# Patient Record
Sex: FEMALE | URBAN - METROPOLITAN AREA
[De-identification: names, ages, dates, MRNs, and addresses within clinical notes are randomized per-mention and may not be internally consistent; named-entity substitution may affect disease eponyms.]

---

## 2017-05-15 ENCOUNTER — APPOINTMENT (OUTPATIENT)
Age: 27
Setting detail: DERMATOLOGY
End: 2017-05-15

## 2017-05-15 DIAGNOSIS — Z71.89 OTHER SPECIFIED COUNSELING: ICD-10-CM

## 2017-05-15 DIAGNOSIS — D22 MELANOCYTIC NEVI: ICD-10-CM

## 2017-05-15 PROBLEM — D23.71 OTHER BENIGN NEOPLASM OF SKIN OF RIGHT LOWER LIMB, INCLUDING HIP: Status: ACTIVE | Noted: 2017-05-15

## 2017-05-15 PROBLEM — D22.62 MELANOCYTIC NEVI OF LEFT UPPER LIMB, INCLUDING SHOULDER: Status: ACTIVE | Noted: 2017-05-15

## 2017-05-15 PROBLEM — D22.4 MELANOCYTIC NEVI OF SCALP AND NECK: Status: ACTIVE | Noted: 2017-05-15

## 2017-05-15 PROCEDURE — OTHER COUNSELING: OTHER

## 2017-05-15 PROCEDURE — 99203 OFFICE O/P NEW LOW 30 MIN: CPT

## 2017-05-15 PROCEDURE — OTHER OBSERVATION AND MEASURE: OTHER

## 2017-05-15 PROCEDURE — OTHER OBSERVATION: OTHER

## 2017-05-15 PROCEDURE — OTHER EDUCATIONAL RESOURCES PROVIDED: OTHER

## 2017-05-15 ASSESSMENT — LOCATION ZONE DERM
LOCATION ZONE: NECK
LOCATION ZONE: ARM

## 2017-05-15 ASSESSMENT — LOCATION DETAILED DESCRIPTION DERM
LOCATION DETAILED: RIGHT INFERIOR ANTERIOR NECK
LOCATION DETAILED: LEFT POSTERIOR SHOULDER
LOCATION DETAILED: LEFT ANTERIOR SHOULDER
LOCATION DETAILED: RIGHT POSTERIOR SHOULDER

## 2017-05-15 ASSESSMENT — LOCATION SIMPLE DESCRIPTION DERM
LOCATION SIMPLE: RIGHT SHOULDER
LOCATION SIMPLE: LEFT SHOULDER
LOCATION SIMPLE: RIGHT ANTERIOR NECK

## 2017-05-15 NOTE — PROCEDURE: OBSERVATION
Detail Level: Detailed
Size Of Lesion: 3 x 6 mm
Size Of Lesion: 3 x 4mm
Morphology Per Location (Optional): Brown Macule
Size Of Lesion: 6mm

## 2017-05-15 NOTE — HPI: SKIN LESIONS
How Severe Is Your Skin Lesion?: mild
Have Your Skin Lesions Been Treated?: not been treated
Is This A New Presentation, Or A Follow-Up?: Skin Lesions
Is This A New Presentation, Or A Follow-Up?: Skin Lesion

## 2019-10-02 ENCOUNTER — APPOINTMENT (OUTPATIENT)
Age: 29
Setting detail: DERMATOLOGY
End: 2019-10-02

## 2019-10-02 DIAGNOSIS — D22 MELANOCYTIC NEVI: ICD-10-CM

## 2019-10-02 DIAGNOSIS — Z71.89 OTHER SPECIFIED COUNSELING: ICD-10-CM

## 2019-10-02 PROBLEM — D22.4 MELANOCYTIC NEVI OF SCALP AND NECK: Status: ACTIVE | Noted: 2019-10-02

## 2019-10-02 PROBLEM — D22.62 MELANOCYTIC NEVI OF LEFT UPPER LIMB, INCLUDING SHOULDER: Status: ACTIVE | Noted: 2019-10-02

## 2019-10-02 PROBLEM — D22.5 MELANOCYTIC NEVI OF TRUNK: Status: ACTIVE | Noted: 2019-10-02

## 2019-10-02 PROBLEM — D23.72 OTHER BENIGN NEOPLASM OF SKIN OF LEFT LOWER LIMB, INCLUDING HIP: Status: ACTIVE | Noted: 2019-10-02

## 2019-10-02 PROCEDURE — OTHER COUNSELING: OTHER

## 2019-10-02 PROCEDURE — OTHER OBSERVATION: OTHER

## 2019-10-02 PROCEDURE — 99214 OFFICE O/P EST MOD 30 MIN: CPT

## 2019-10-02 PROCEDURE — OTHER EDUCATIONAL RESOURCES PROVIDED: OTHER

## 2019-10-02 PROCEDURE — OTHER OBSERVATION AND MEASURE: OTHER

## 2019-10-02 ASSESSMENT — LOCATION ZONE DERM
LOCATION ZONE: TRUNK
LOCATION ZONE: ARM
LOCATION ZONE: NECK

## 2019-10-02 ASSESSMENT — LOCATION SIMPLE DESCRIPTION DERM
LOCATION SIMPLE: UPPER BACK
LOCATION SIMPLE: RIGHT ANTERIOR NECK
LOCATION SIMPLE: LEFT SHOULDER

## 2019-10-02 ASSESSMENT — LOCATION DETAILED DESCRIPTION DERM
LOCATION DETAILED: RIGHT INFERIOR ANTERIOR NECK
LOCATION DETAILED: LEFT ANTERIOR SHOULDER
LOCATION DETAILED: SUPERIOR THORACIC SPINE
LOCATION DETAILED: INFERIOR THORACIC SPINE

## 2019-10-02 NOTE — PROCEDURE: OBSERVATION
Size Of Lesion: 6mm
Morphology Per Location (Optional): Symmetric brown papule
Detail Level: Detailed
Size Of Lesion: 4mm x 6mm
Morphology Per Location (Optional): Brown papule
Size Of Lesion: 3mm x 4mm

## 2021-10-14 ENCOUNTER — APPOINTMENT (OUTPATIENT)
Age: 31
Setting detail: DERMATOLOGY
End: 2021-10-14

## 2021-10-14 DIAGNOSIS — Z12.83 ENCOUNTER FOR SCREENING FOR MALIGNANT NEOPLASM OF SKIN: ICD-10-CM

## 2021-10-14 DIAGNOSIS — D22 MELANOCYTIC NEVI: ICD-10-CM

## 2021-10-14 PROBLEM — D22.62 MELANOCYTIC NEVI OF LEFT UPPER LIMB, INCLUDING SHOULDER: Status: ACTIVE | Noted: 2021-10-14

## 2021-10-14 PROBLEM — D22.4 MELANOCYTIC NEVI OF SCALP AND NECK: Status: ACTIVE | Noted: 2021-10-14

## 2021-10-14 PROBLEM — D23.72 OTHER BENIGN NEOPLASM OF SKIN OF LEFT LOWER LIMB, INCLUDING HIP: Status: ACTIVE | Noted: 2021-10-14

## 2021-10-14 PROBLEM — D22.5 MELANOCYTIC NEVI OF TRUNK: Status: ACTIVE | Noted: 2021-10-14

## 2021-10-14 PROCEDURE — 99213 OFFICE O/P EST LOW 20 MIN: CPT

## 2021-10-14 PROCEDURE — OTHER COUNSELING: OTHER

## 2021-10-14 PROCEDURE — OTHER EDUCATIONAL RESOURCES PROVIDED: OTHER

## 2021-10-14 PROCEDURE — OTHER OBSERVATION AND MEASURE: OTHER

## 2021-10-14 PROCEDURE — OTHER OBSERVATION: OTHER

## 2021-10-14 ASSESSMENT — LOCATION SIMPLE DESCRIPTION DERM
LOCATION SIMPLE: LEFT SHOULDER
LOCATION SIMPLE: RIGHT ANTERIOR NECK
LOCATION SIMPLE: UPPER BACK

## 2021-10-14 ASSESSMENT — LOCATION ZONE DERM
LOCATION ZONE: NECK
LOCATION ZONE: TRUNK
LOCATION ZONE: ARM

## 2021-10-14 ASSESSMENT — LOCATION DETAILED DESCRIPTION DERM
LOCATION DETAILED: RIGHT INFERIOR ANTERIOR NECK
LOCATION DETAILED: SUPERIOR THORACIC SPINE
LOCATION DETAILED: LEFT ANTERIOR SHOULDER

## 2021-10-14 NOTE — PROCEDURE: COUNSELING
Detail Level: Simple
Detail Level: Generalized
Detail Level: Detailed
Sunscreen Recommendations: -\\nRemoval via shave biopsy was discussed with the patient. She will follow up if she decides to move forward with treatment.

## 2023-07-05 ENCOUNTER — HOSPITAL ENCOUNTER (OUTPATIENT)
Dept: DATA CONVERSION | Facility: HOSPITAL | Age: 33
End: 2023-07-05
Attending: ANESTHESIOLOGY | Admitting: ANESTHESIOLOGY

## 2023-07-05 DIAGNOSIS — M54.16 RADICULOPATHY, LUMBAR REGION: ICD-10-CM

## 2023-07-05 DIAGNOSIS — M51.16 INTERVERTEBRAL DISC DISORDERS WITH RADICULOPATHY, LUMBAR REGION: ICD-10-CM

## 2023-10-02 NOTE — OP NOTE
Post Operative Note:     Post-Procedure Diagnosis: Left-sided radiculitis,  lumbar disc disease   Procedure: 1.   2.   3.   4.   5.   Surgeon: Hans   Resident/Fellow/Other Assistant: None   Estimated Blood Loss (mL): none   Specimen: no   Findings: None     Operative Report Dictated:  Dictation: not applicable - note contains Operative  Report   Operative Report:    Preoperative diagnosis:  Lumbar radiculitis  Postoperative diagnosis:  Lumbar radiculitis, lumbar disc disease  Procedure: Left L5 and S1 lumbar transforaminal epidural steroid injection under fluoroscopic guidance  Surgeon: Kala Maxwell  Assistant:  Fellow  Anesthesia: Local  Complications: Apparently none    Clinical note: Ms. Whiteside is a 33-year-old female with history of low back and left leg pain.  Patient presents today for the aforementioned procedure.  Since I last saw the patient she saw Dr. Barajas  and did have an MRI.  Her MRI was concordant with what we talked about in the office which is a left-sided disc herniation at L5-S1.    Procedure note: The patient was met in the preoperative holding area after risks benefits and alternatives to procedure were discussed with the patient, informed consent was obtained. Patient brought back to the procedure room and placed in the prone  position on the fluoroscopy table. Area over the back was exposed, prepped, draped, in the usual sterile fashion.  Skin and subcutaneous tissues to the neuroforamen was anesthetized using 0.5% lidocaine.  22-gauge Sprotte needles were inserted in the  skin and advanced into the foramen. Needle tip position was confirmed in AP oblique and lateral view.  Contrast was injected which showed appropriate epidural spread, no intravascular or intrathecal uptake. A total of 2 mL of 0.5% lidocaine mixed with  10 mg dexamethasone was injected in divided doses among the 2 needles. Needles removed, bandage applied, patient tolerated the procedure well with no immediate  complications.      Attestation:   Note Completion:  Attending Attestation I performed the procedure without a resident         Electronic Signatures:  Kala Maxwell (MD)  (Signed 05-Jul-2023 15:03)   Authored: Post Operative Note, Note Completion      Last Updated: 05-Jul-2023 15:03 by Kala Maxwell)

## 2024-06-25 ENCOUNTER — OFFICE VISIT (OUTPATIENT)
Dept: ORTHOPEDIC SURGERY | Facility: CLINIC | Age: 34
End: 2024-06-25
Payer: COMMERCIAL

## 2024-06-25 VITALS — HEIGHT: 65 IN | WEIGHT: 140 LBS | BODY MASS INDEX: 23.32 KG/M2

## 2024-06-25 DIAGNOSIS — M54.16 LUMBAR RADICULOPATHY: ICD-10-CM

## 2024-06-25 DIAGNOSIS — M51.26 DISPLACEMENT OF LUMBAR INTERVERTEBRAL DISC WITHOUT MYELOPATHY: Primary | ICD-10-CM

## 2024-06-25 PROCEDURE — 99214 OFFICE O/P EST MOD 30 MIN: CPT | Performed by: ORTHOPAEDIC SURGERY

## 2024-06-25 ASSESSMENT — PAIN - FUNCTIONAL ASSESSMENT: PAIN_FUNCTIONAL_ASSESSMENT: 0-10

## 2024-06-25 NOTE — PROGRESS NOTES
HPI:Madyson Whiteside is a 34-year-old woman who comes in today for a follow-up.  She continues to have left buttock and leg pain.  She has had radicular pain in that leg for upwards of a year.  She has completed physical therapy [facility].  She had a steroid injection.  Despite 12 months of nonoperative management, her symptoms remain.  She has a hard time sitting in a chair secondary to pain in her leg.  Her quality of life is now intolerable.      ROS:  Reviewed on EMR and patient intake sheet.    PMH/SH:  Reviewed on EMR and patient intake sheet.    Exam:  Physical Exam    Constitutional: Well appearing; no acute distress  Eyes: pupils are equal and round  Psych: normal affect  Respiratory: non-labored breathing  Cardiovascular: regular rate and rhythm  GI: non-distended abdomen  Musculoskeletal: no pain with range of motion of the hips bilaterally  Neurologic: [5]/5 strength in the lower extremities bilaterally]; [positive left] straight leg raise    Radiology:     MRI in June 2023 demonstrates a large left L5-S1 paracentral disc herniation with displacement of the traversing S1 nerve root    Diagnosis:    Lumbar radiculopathy; L5-S1 disc herniation    Assessment and Plan:   34-year-old woman with intractable lumbar radiculopathy secondary to an L5-S1 disc herniation which is failed 12 months of nonoperative management including physical therapy and steroid injections.  At this time, she will need an updated MRI and then return to discuss potential surgical intervention.  I will see her back after the MRI.    The patient was in agreement with the plan. At the end of the visit today, the patient felt that all questions had been answered satisfactorily.  The patient was pleased with the visit and very appreciative for the care rendered.     Thank you very much for the kind referral.  It is a privilege, and a pleasure, to partner with you in the care of your patients.  I would be delighted to assist you with  any further consultations as needed.          Bartolo Barajas MD    Chief of Spine Surgery, [facility]  Director of Spine Service, [facility]  , Department of Orthopaedics  [facility] [employer] [facility]  [address] [place] [address]  P: [phone]  [email or web address]    This note was dictated with voice recognition software.  It has not been proofread for grammatical errors, typographical mistakes or other semantic inconsistencies.

## 2024-07-02 ENCOUNTER — APPOINTMENT (OUTPATIENT)
Dept: ORTHOPEDIC SURGERY | Facility: CLINIC | Age: 34
End: 2024-07-02
Payer: COMMERCIAL

## 2024-07-11 ENCOUNTER — APPOINTMENT (OUTPATIENT)
Dept: PAIN MEDICINE | Facility: HOSPITAL | Age: 34
End: 2024-07-11
Payer: COMMERCIAL

## 2024-07-12 ENCOUNTER — HOSPITAL ENCOUNTER (OUTPATIENT)
Dept: RADIOLOGY | Facility: HOSPITAL | Age: 34
Discharge: HOME | End: 2024-07-12
Payer: COMMERCIAL

## 2024-07-12 DIAGNOSIS — M54.16 LUMBAR RADICULOPATHY: ICD-10-CM

## 2024-07-12 PROCEDURE — 72148 MRI LUMBAR SPINE W/O DYE: CPT

## 2024-07-15 ENCOUNTER — APPOINTMENT (OUTPATIENT)
Dept: RADIOLOGY | Facility: HOSPITAL | Age: 34
End: 2024-07-15
Payer: COMMERCIAL

## 2024-07-16 ENCOUNTER — TRANSCRIBE ORDERS (OUTPATIENT)
Dept: ORTHOPEDIC SURGERY | Facility: HOSPITAL | Age: 34
End: 2024-07-16

## 2024-07-16 ENCOUNTER — OFFICE VISIT (OUTPATIENT)
Dept: ORTHOPEDIC SURGERY | Facility: CLINIC | Age: 34
End: 2024-07-16
Payer: COMMERCIAL

## 2024-07-16 ENCOUNTER — APPOINTMENT (OUTPATIENT)
Dept: PAIN MEDICINE | Facility: HOSPITAL | Age: 34
End: 2024-07-16
Payer: COMMERCIAL

## 2024-07-16 VITALS — BODY MASS INDEX: 23.32 KG/M2 | WEIGHT: 140 LBS | HEIGHT: 65 IN

## 2024-07-16 DIAGNOSIS — M51.26 DISPLACEMENT OF LUMBAR INTERVERTEBRAL DISC WITHOUT MYELOPATHY: ICD-10-CM

## 2024-07-16 DIAGNOSIS — M54.16 LUMBAR RADICULOPATHY: ICD-10-CM

## 2024-07-16 DIAGNOSIS — M51.26 DISPLACEMENT OF LUMBAR INTERVERTEBRAL DISC WITHOUT MYELOPATHY: Primary | ICD-10-CM

## 2024-07-16 DIAGNOSIS — Z01.812 ENCOUNTER FOR PRE-OPERATIVE LABORATORY TESTING: ICD-10-CM

## 2024-07-16 PROCEDURE — 99214 OFFICE O/P EST MOD 30 MIN: CPT | Performed by: ORTHOPAEDIC SURGERY

## 2024-07-16 ASSESSMENT — PAIN - FUNCTIONAL ASSESSMENT: PAIN_FUNCTIONAL_ASSESSMENT: 0-10

## 2024-07-16 NOTE — H&P (VIEW-ONLY)
HPI:Madyson Whiteside is a 34-year-old woman, who comes in today for a follow-up to go over her new MRI.  She continues to have left buttock and leg pain which is worse with sitting.  The symptoms have persisted despite 12 months of nonoperative management including physical therapy.  She has also had steroid injections.      ROS:  Reviewed on EMR and patient intake sheet.    PMH/SH:  Reviewed on EMR and patient intake sheet.    Exam:  Physical Exam    Constitutional: Well appearing; no acute distress  Eyes: pupils are equal and round  Psych: normal affect  Respiratory: non-labored breathing  Cardiovascular: regular rate and rhythm  GI: non-distended abdomen  Musculoskeletal: no pain with range of motion of the hips bilaterally  Neurologic: [4+]/5 strength in the lower extremities bilaterally]; [positive left] straight leg raise    Radiology:     MRI demonstrates a left L5-S1 disc herniation with an extruded fragment producing lateral recess stenosis and displacement of the traversing S1 nerve root.    Diagnosis:    Lumbar radiculopathy; L5-S1 disc herniation    Assessment and Plan:   34-year-old woman with intractable lumbar radiculopathy despite 12 months of nonoperative management including physical therapy and steroid injections.  Repeat MRI demonstrates a persistent extruded fragment of disc material in the lateral recess which has displaced the traversing S1 nerve root.  Because of failure nonoperative management, the patient would like proceed with surgical intervention.  She would need a left L5-S1 microdiscectomy.  CPT code 82873.    We discussed surgery today at length, including the specifics of the actual proposed procedure, the projected hospital course and post-operative recovery or rehabilitation, and the expected results of this surgery.  The potential benefits and risks of the proposed surgery include, but are not limited to those of infection, spinal fluid leaks, nerve injury or paralysis, whether that  be complete or partial paralysis, foot drop, instrumentation failure, non-union or latent instability, future adjacent segment disease, epidural hematoma, wound dehiscence, reherniation, persistent pain or paresthesias, and the general risks of anaesthesia including, but not limited to those of stroke, heart attack, respiratory difficulties and death.  The patient understands that there are no guarantees in regard to outcome or potential complications associated with the proposed procedure.  After this discussion, the patient articulated understanding of the topics covered and felt that all questions had been covered and answered satisfactorily.    Patient would like to proceed as soon as possible.    The patient was in agreement with the plan. At the end of the visit today, the patient felt that all questions had been answered satisfactorily.  The patient was pleased with the visit and very appreciative for the care rendered.     Thank you very much for the kind referral.  It is a privilege, and a pleasure, to partner with you in the care of your patients.  I would be delighted to assist you with any further consultations as needed.          Bartolo Barajas MD    Chief of Spine Surgery, [facility]  Director of Spine Service, [facility]  , Department of Orthopaedics  [facility]  [address] [place] [address]  P: [phone]  [facility].[employer]    This note was dictated with voice recognition software.  It has not been proofread for grammatical errors, typographical mistakes or other semantic inconsistencies.

## 2024-07-16 NOTE — PROGRESS NOTES
HPI:Madyson Whiteside is a 34-year-old woman, who comes in today for a follow-up to go over her new MRI.  She continues to have left buttock and leg pain which is worse with sitting.  The symptoms have persisted despite 12 months of nonoperative management including physical therapy.  She has also had steroid injections.      ROS:  Reviewed on EMR and patient intake sheet.    PMH/SH:  Reviewed on EMR and patient intake sheet.    Exam:  Physical Exam    Constitutional: Well appearing; no acute distress  Eyes: pupils are equal and round  Psych: normal affect  Respiratory: non-labored breathing  Cardiovascular: regular rate and rhythm  GI: non-distended abdomen  Musculoskeletal: no pain with range of motion of the hips bilaterally  Neurologic: [4+]/5 strength in the lower extremities bilaterally]; [positive left] straight leg raise    Radiology:     MRI demonstrates a left L5-S1 disc herniation with an extruded fragment producing lateral recess stenosis and displacement of the traversing S1 nerve root.    Diagnosis:    Lumbar radiculopathy; L5-S1 disc herniation    Assessment and Plan:   34-year-old woman with intractable lumbar radiculopathy despite 12 months of nonoperative management including physical therapy and steroid injections.  Repeat MRI demonstrates a persistent extruded fragment of disc material in the lateral recess which has displaced the traversing S1 nerve root.  Because of failure nonoperative management, the patient would like proceed with surgical intervention.  She would need a left L5-S1 microdiscectomy.  CPT code 27210.    We discussed surgery today at length, including the specifics of the actual proposed procedure, the projected hospital course and post-operative recovery or rehabilitation, and the expected results of this surgery.  The potential benefits and risks of the proposed surgery include, but are not limited to those of infection, spinal fluid leaks, nerve injury or paralysis, whether that  be complete or partial paralysis, foot drop, instrumentation failure, non-union or latent instability, future adjacent segment disease, epidural hematoma, wound dehiscence, reherniation, persistent pain or paresthesias, and the general risks of anaesthesia including, but not limited to those of stroke, heart attack, respiratory difficulties and death.  The patient understands that there are no guarantees in regard to outcome or potential complications associated with the proposed procedure.  After this discussion, the patient articulated understanding of the topics covered and felt that all questions had been covered and answered satisfactorily.    Patient would like to proceed as soon as possible.    The patient was in agreement with the plan. At the end of the visit today, the patient felt that all questions had been answered satisfactorily.  The patient was pleased with the visit and very appreciative for the care rendered.     Thank you very much for the kind referral.  It is a privilege, and a pleasure, to partner with you in the care of your patients.  I would be delighted to assist you with any further consultations as needed.          Bartolo Barajas MD    Chief of Spine Surgery, [facility]  Director of Spine Service, [facility]  , Department of Orthopaedics  [facility]  [address] [place] [address]  P: [phone]  [facility].[employer]    This note was dictated with voice recognition software.  It has not been proofread for grammatical errors, typographical mistakes or other semantic inconsistencies.

## 2024-07-19 ENCOUNTER — LAB (OUTPATIENT)
Dept: LAB | Facility: LAB | Age: 34
End: 2024-07-19
Payer: COMMERCIAL

## 2024-07-19 DIAGNOSIS — Z01.812 ENCOUNTER FOR PRE-OPERATIVE LABORATORY TESTING: ICD-10-CM

## 2024-07-19 LAB
ALBUMIN SERPL BCP-MCNC: 4.5 G/DL (ref 3.4–5)
ALP SERPL-CCNC: 31 U/L (ref 33–110)
ALT SERPL W P-5'-P-CCNC: 14 U/L (ref 7–45)
ANION GAP SERPL CALC-SCNC: 14 MMOL/L (ref 10–20)
AST SERPL W P-5'-P-CCNC: 15 U/L (ref 9–39)
BILIRUB SERPL-MCNC: 0.4 MG/DL (ref 0–1.2)
BUN SERPL-MCNC: 18 MG/DL (ref 6–23)
CALCIUM SERPL-MCNC: 9.4 MG/DL (ref 8.6–10.6)
CHLORIDE SERPL-SCNC: 104 MMOL/L (ref 98–107)
CO2 SERPL-SCNC: 26 MMOL/L (ref 21–32)
CREAT SERPL-MCNC: 0.7 MG/DL (ref 0.5–1.05)
EGFRCR SERPLBLD CKD-EPI 2021: >90 ML/MIN/1.73M*2
ERYTHROCYTE [DISTWIDTH] IN BLOOD BY AUTOMATED COUNT: 13.4 % (ref 11.5–14.5)
GLUCOSE SERPL-MCNC: 83 MG/DL (ref 74–99)
HCT VFR BLD AUTO: 33.8 % (ref 36–46)
HGB BLD-MCNC: 11.3 G/DL (ref 12–16)
INR PPP: 1 (ref 0.9–1.1)
MCH RBC QN AUTO: 29.4 PG (ref 26–34)
MCHC RBC AUTO-ENTMCNC: 33.4 G/DL (ref 32–36)
MCV RBC AUTO: 88 FL (ref 80–100)
NRBC BLD-RTO: 0 /100 WBCS (ref 0–0)
PLATELET # BLD AUTO: 272 X10*3/UL (ref 150–450)
POTASSIUM SERPL-SCNC: 4.7 MMOL/L (ref 3.5–5.3)
PROT SERPL-MCNC: 6.6 G/DL (ref 6.4–8.2)
PROTHROMBIN TIME: 11.3 SECONDS (ref 9.8–12.8)
RBC # BLD AUTO: 3.84 X10*6/UL (ref 4–5.2)
SODIUM SERPL-SCNC: 139 MMOL/L (ref 136–145)
WBC # BLD AUTO: 5.1 X10*3/UL (ref 4.4–11.3)

## 2024-07-19 PROCEDURE — 36415 COLL VENOUS BLD VENIPUNCTURE: CPT

## 2024-07-19 PROCEDURE — 80053 COMPREHEN METABOLIC PANEL: CPT

## 2024-07-19 PROCEDURE — 85027 COMPLETE CBC AUTOMATED: CPT

## 2024-07-19 PROCEDURE — 85610 PROTHROMBIN TIME: CPT

## 2024-07-24 ENCOUNTER — ANESTHESIA EVENT (OUTPATIENT)
Dept: OPERATING ROOM | Facility: HOSPITAL | Age: 34
End: 2024-07-24
Payer: COMMERCIAL

## 2024-07-24 NOTE — ANESTHESIA PREPROCEDURE EVALUATION
"Patient: Madyson Whiteside    Procedure Information       Date/Time: 07/25/24 0730    Procedure: Left L5-S1 Lumbar Microdiscectomy (Left: Spine Lumbar)    Location: [facility] A OR 07 / [facility] A OR    Surgeons: Bartolo Barajas MD            Relevant Problems   Neuro   (+) Lumbar radiculopathy      Musculoskeletal   (+) Displacement of lumbar intervertebral disc without myelopathy       Clinical information reviewed:                    No past medical history on file.   No past surgical history on file.      No current outpatient medications   No Known Allergies     Chemistry    Lab Results   Component Value Date/Time     07/19/2024 1039    K 4.7 07/19/2024 1039     07/19/2024 1039    CO2 26 07/19/2024 1039    BUN 18 07/19/2024 1039    CREATININE 0.70 07/19/2024 1039    Lab Results   Component Value Date/Time    CALCIUM 9.4 07/19/2024 1039    ALKPHOS 31 (L) 07/19/2024 1039    AST 15 07/19/2024 1039    ALT 14 07/19/2024 1039    BILITOT 0.4 07/19/2024 1039          No results found for: \"HGBA1C\"  Lab Results   Component Value Date/Time    WBC 5.1 07/19/2024 1039    HGB 11.3 (L) 07/19/2024 1039    HCT 33.8 (L) 07/19/2024 1039     07/19/2024 1039     Lab Results   Component Value Date/Time    PROTIME 11.3 07/19/2024 1039    INR 1.0 07/19/2024 1039     No results found for: \"ABORH\"  No results found for this or any previous visit (from the past 4464 hour(s)).  No results found for this or any previous visit from the past 1095 days.       There were no vitals taken for this visit.  No data recorded     Physical Exam    Airway  Mallampati: II  TM distance: >3 FB  Neck ROM: full     Cardiovascular - normal exam     Dental - normal exam     Pulmonary - normal exam     Abdominal - normal exam              Anesthesia Plan    History of general anesthesia?: no  History of complications of general anesthesia?: no    ASA 2     general     The patient is not a current smoker.    intravenous induction   Postoperative " administration of opioids is intended.  Anesthetic plan and risks discussed with patient.  Use of blood products discussed with patient who.    Plan discussed with CRNA.

## 2024-07-25 ENCOUNTER — ANESTHESIA (OUTPATIENT)
Dept: OPERATING ROOM | Facility: HOSPITAL | Age: 34
End: 2024-07-25
Payer: COMMERCIAL

## 2024-07-25 ENCOUNTER — APPOINTMENT (OUTPATIENT)
Dept: RADIOLOGY | Facility: HOSPITAL | Age: 34
End: 2024-07-25
Payer: COMMERCIAL

## 2024-07-25 ENCOUNTER — HOSPITAL ENCOUNTER (OUTPATIENT)
Facility: HOSPITAL | Age: 34
Setting detail: OUTPATIENT SURGERY
Discharge: HOME | End: 2024-07-25
Attending: ORTHOPAEDIC SURGERY | Admitting: ORTHOPAEDIC SURGERY
Payer: COMMERCIAL

## 2024-07-25 VITALS
HEIGHT: 65 IN | OXYGEN SATURATION: 97 % | RESPIRATION RATE: 14 BRPM | WEIGHT: 141.54 LBS | HEART RATE: 87 BPM | SYSTOLIC BLOOD PRESSURE: 111 MMHG | DIASTOLIC BLOOD PRESSURE: 70 MMHG | TEMPERATURE: 98.2 F | BODY MASS INDEX: 23.58 KG/M2

## 2024-07-25 DIAGNOSIS — T40.2X5A CONSTIPATION DUE TO OPIOID THERAPY: ICD-10-CM

## 2024-07-25 DIAGNOSIS — M51.26 DISPLACEMENT OF LUMBAR INTERVERTEBRAL DISC WITHOUT MYELOPATHY: ICD-10-CM

## 2024-07-25 DIAGNOSIS — R11.0 POSTOPERATIVE NAUSEA: ICD-10-CM

## 2024-07-25 DIAGNOSIS — K59.03 CONSTIPATION DUE TO OPIOID THERAPY: ICD-10-CM

## 2024-07-25 DIAGNOSIS — M54.16 LUMBAR RADICULOPATHY: Primary | ICD-10-CM

## 2024-07-25 DIAGNOSIS — G89.18 POSTOPERATIVE PAIN AFTER SPINAL SURGERY: ICD-10-CM

## 2024-07-25 DIAGNOSIS — Z98.890 POSTOPERATIVE NAUSEA: ICD-10-CM

## 2024-07-25 LAB — PREGNANCY TEST URINE, POC: NEGATIVE

## 2024-07-25 PROCEDURE — 3700000001 HC GENERAL ANESTHESIA TIME - INITIAL BASE CHARGE: Performed by: ORTHOPAEDIC SURGERY

## 2024-07-25 PROCEDURE — 63030 LAMOT DCMPRN NRV RT 1 LMBR: CPT | Performed by: PHYSICIAN ASSISTANT

## 2024-07-25 PROCEDURE — 7100000001 HC RECOVERY ROOM TIME - INITIAL BASE CHARGE: Performed by: ORTHOPAEDIC SURGERY

## 2024-07-25 PROCEDURE — 3600000009 HC OR TIME - EACH INCREMENTAL 1 MINUTE - PROCEDURE LEVEL FOUR: Performed by: ORTHOPAEDIC SURGERY

## 2024-07-25 PROCEDURE — 7100000009 HC PHASE TWO TIME - INITIAL BASE CHARGE: Performed by: ORTHOPAEDIC SURGERY

## 2024-07-25 PROCEDURE — 3700000002 HC GENERAL ANESTHESIA TIME - EACH INCREMENTAL 1 MINUTE: Performed by: ORTHOPAEDIC SURGERY

## 2024-07-25 PROCEDURE — 3600000004 HC OR TIME - INITIAL BASE CHARGE - PROCEDURE LEVEL FOUR: Performed by: ORTHOPAEDIC SURGERY

## 2024-07-25 PROCEDURE — 2720000007 HC OR 272 NO HCPCS: Performed by: ORTHOPAEDIC SURGERY

## 2024-07-25 PROCEDURE — 63030 LAMOT DCMPRN NRV RT 1 LMBR: CPT | Performed by: ORTHOPAEDIC SURGERY

## 2024-07-25 PROCEDURE — 7100000010 HC PHASE TWO TIME - EACH INCREMENTAL 1 MINUTE: Performed by: ORTHOPAEDIC SURGERY

## 2024-07-25 PROCEDURE — 2500000001 HC RX 250 WO HCPCS SELF ADMINISTERED DRUGS (ALT 637 FOR MEDICARE OP): Performed by: ANESTHESIOLOGY

## 2024-07-25 PROCEDURE — 7100000002 HC RECOVERY ROOM TIME - EACH INCREMENTAL 1 MINUTE: Performed by: ORTHOPAEDIC SURGERY

## 2024-07-25 PROCEDURE — 81025 URINE PREGNANCY TEST: CPT | Performed by: ORTHOPAEDIC SURGERY

## 2024-07-25 PROCEDURE — 2500000005 HC RX 250 GENERAL PHARMACY W/O HCPCS

## 2024-07-25 PROCEDURE — 2500000004 HC RX 250 GENERAL PHARMACY W/ HCPCS (ALT 636 FOR OP/ED)

## 2024-07-25 PROCEDURE — 2500000005 HC RX 250 GENERAL PHARMACY W/O HCPCS: Performed by: ORTHOPAEDIC SURGERY

## 2024-07-25 PROCEDURE — 2500000004 HC RX 250 GENERAL PHARMACY W/ HCPCS (ALT 636 FOR OP/ED): Performed by: ANESTHESIOLOGY

## 2024-07-25 PROCEDURE — 76000 FLUOROSCOPY <1 HR PHYS/QHP: CPT

## 2024-07-25 RX ORDER — HYDROMORPHONE HYDROCHLORIDE 1 MG/ML
INJECTION, SOLUTION INTRAMUSCULAR; INTRAVENOUS; SUBCUTANEOUS CONTINUOUS PRN
Status: DISCONTINUED | OUTPATIENT
Start: 2024-07-25 | End: 2024-07-25

## 2024-07-25 RX ORDER — ACETAMINOPHEN 325 MG/1
650 TABLET ORAL EVERY 4 HOURS PRN
Status: DISCONTINUED | OUTPATIENT
Start: 2024-07-25 | End: 2024-07-25 | Stop reason: HOSPADM

## 2024-07-25 RX ORDER — LIDOCAINE 560 MG/1
PATCH PERCUTANEOUS; TOPICAL; TRANSDERMAL AS NEEDED
Status: DISCONTINUED | OUTPATIENT
Start: 2024-07-25 | End: 2024-07-25 | Stop reason: HOSPADM

## 2024-07-25 RX ORDER — OXYCODONE HYDROCHLORIDE 5 MG/1
5 TABLET ORAL EVERY 4 HOURS PRN
Status: DISCONTINUED | OUTPATIENT
Start: 2024-07-25 | End: 2024-07-25 | Stop reason: HOSPADM

## 2024-07-25 RX ORDER — GENTAMICIN 40 MG/ML
INJECTION, SOLUTION INTRAMUSCULAR; INTRAVENOUS AS NEEDED
Status: DISCONTINUED | OUTPATIENT
Start: 2024-07-25 | End: 2024-07-25

## 2024-07-25 RX ORDER — PROPOFOL 10 MG/ML
INJECTION, EMULSION INTRAVENOUS AS NEEDED
Status: DISCONTINUED | OUTPATIENT
Start: 2024-07-25 | End: 2024-07-25

## 2024-07-25 RX ORDER — SODIUM CHLORIDE, SODIUM LACTATE, POTASSIUM CHLORIDE, CALCIUM CHLORIDE 600; 310; 30; 20 MG/100ML; MG/100ML; MG/100ML; MG/100ML
100 INJECTION, SOLUTION INTRAVENOUS CONTINUOUS
Status: DISCONTINUED | OUTPATIENT
Start: 2024-07-25 | End: 2024-07-25 | Stop reason: HOSPADM

## 2024-07-25 RX ORDER — ONDANSETRON 4 MG/1
4 TABLET, FILM COATED ORAL EVERY 8 HOURS PRN
Qty: 15 TABLET | Refills: 0 | Status: SHIPPED | OUTPATIENT
Start: 2024-07-25 | End: 2024-07-30

## 2024-07-25 RX ORDER — SODIUM CHLORIDE 0.9 G/100ML
IRRIGANT IRRIGATION AS NEEDED
Status: DISCONTINUED | OUTPATIENT
Start: 2024-07-25 | End: 2024-07-25 | Stop reason: HOSPADM

## 2024-07-25 RX ORDER — DOCUSATE SODIUM 100 MG/1
100 CAPSULE, LIQUID FILLED ORAL 2 TIMES DAILY
Qty: 20 CAPSULE | Refills: 0 | Status: SHIPPED | OUTPATIENT
Start: 2024-07-25 | End: 2024-08-04

## 2024-07-25 RX ORDER — ACETAMINOPHEN 500 MG
1000 TABLET ORAL EVERY 6 HOURS PRN
COMMUNITY
End: 2024-07-25 | Stop reason: HOSPADM

## 2024-07-25 RX ORDER — MIDAZOLAM HYDROCHLORIDE 1 MG/ML
INJECTION INTRAMUSCULAR; INTRAVENOUS AS NEEDED
Status: DISCONTINUED | OUTPATIENT
Start: 2024-07-25 | End: 2024-07-25

## 2024-07-25 RX ORDER — CEFAZOLIN 1 G/1
INJECTION, POWDER, FOR SOLUTION INTRAVENOUS AS NEEDED
Status: DISCONTINUED | OUTPATIENT
Start: 2024-07-25 | End: 2024-07-25

## 2024-07-25 RX ORDER — KETOROLAC TROMETHAMINE 10 MG/1
10 TABLET, FILM COATED ORAL EVERY 6 HOURS PRN
Qty: 15 TABLET | Refills: 0 | Status: SHIPPED | OUTPATIENT
Start: 2024-07-25 | End: 2024-07-30

## 2024-07-25 RX ORDER — SODIUM CHLORIDE, SODIUM LACTATE, POTASSIUM CHLORIDE, CALCIUM CHLORIDE 600; 310; 30; 20 MG/100ML; MG/100ML; MG/100ML; MG/100ML
INJECTION, SOLUTION INTRAVENOUS CONTINUOUS PRN
Status: DISCONTINUED | OUTPATIENT
Start: 2024-07-25 | End: 2024-07-25

## 2024-07-25 RX ORDER — METHOCARBAMOL 100 MG/ML
INJECTION, SOLUTION INTRAMUSCULAR; INTRAVENOUS AS NEEDED
Status: DISCONTINUED | OUTPATIENT
Start: 2024-07-25 | End: 2024-07-25

## 2024-07-25 RX ORDER — ACETAMINOPHEN 500 MG
500 TABLET ORAL EVERY 6 HOURS PRN
Qty: 40 TABLET | Refills: 0 | Status: SHIPPED | OUTPATIENT
Start: 2024-07-25 | End: 2024-08-04

## 2024-07-25 RX ORDER — METHOCARBAMOL 500 MG/1
500 TABLET, FILM COATED ORAL 4 TIMES DAILY PRN
Qty: 40 TABLET | Refills: 0 | Status: SHIPPED | OUTPATIENT
Start: 2024-07-25 | End: 2024-08-04

## 2024-07-25 RX ORDER — FENTANYL CITRATE 50 UG/ML
INJECTION, SOLUTION INTRAMUSCULAR; INTRAVENOUS AS NEEDED
Status: DISCONTINUED | OUTPATIENT
Start: 2024-07-25 | End: 2024-07-25

## 2024-07-25 RX ORDER — OXYCODONE HYDROCHLORIDE 5 MG/1
5 TABLET ORAL EVERY 6 HOURS PRN
Qty: 28 TABLET | Refills: 0 | Status: SHIPPED | OUTPATIENT
Start: 2024-07-25 | End: 2024-08-01

## 2024-07-25 RX ORDER — LIDOCAINE HYDROCHLORIDE 10 MG/ML
0.1 INJECTION, SOLUTION EPIDURAL; INFILTRATION; INTRACAUDAL; PERINEURAL ONCE
Status: DISCONTINUED | OUTPATIENT
Start: 2024-07-25 | End: 2024-07-25 | Stop reason: HOSPADM

## 2024-07-25 RX ORDER — LIDOCAINE HYDROCHLORIDE 20 MG/ML
INJECTION, SOLUTION EPIDURAL; INFILTRATION; INTRACAUDAL; PERINEURAL AS NEEDED
Status: DISCONTINUED | OUTPATIENT
Start: 2024-07-25 | End: 2024-07-25

## 2024-07-25 RX ORDER — ONDANSETRON HYDROCHLORIDE 2 MG/ML
INJECTION, SOLUTION INTRAVENOUS AS NEEDED
Status: DISCONTINUED | OUTPATIENT
Start: 2024-07-25 | End: 2024-07-25

## 2024-07-25 RX ORDER — ROCURONIUM BROMIDE 10 MG/ML
INJECTION, SOLUTION INTRAVENOUS AS NEEDED
Status: DISCONTINUED | OUTPATIENT
Start: 2024-07-25 | End: 2024-07-25

## 2024-07-25 RX ORDER — IBUPROFEN 600 MG/1
600 TABLET ORAL EVERY 6 HOURS PRN
COMMUNITY
End: 2024-07-25 | Stop reason: HOSPADM

## 2024-07-25 SDOH — HEALTH STABILITY: MENTAL HEALTH: CURRENT SMOKER: 0

## 2024-07-25 ASSESSMENT — PAIN SCALES - GENERAL
PAINLEVEL_OUTOF10: 6
PAINLEVEL_OUTOF10: 7
PAINLEVEL_OUTOF10: 0 - NO PAIN
PAINLEVEL_OUTOF10: 5 - MODERATE PAIN
PAINLEVEL_OUTOF10: 6
PAINLEVEL_OUTOF10: 2
PAINLEVEL_OUTOF10: 2
PAINLEVEL_OUTOF10: 4
PAINLEVEL_OUTOF10: 6
PAINLEVEL_OUTOF10: 5 - MODERATE PAIN
PAINLEVEL_OUTOF10: 3

## 2024-07-25 ASSESSMENT — COLUMBIA-SUICIDE SEVERITY RATING SCALE - C-SSRS
2. HAVE YOU ACTUALLY HAD ANY THOUGHTS OF KILLING YOURSELF?: NO
1. IN THE PAST MONTH, HAVE YOU WISHED YOU WERE DEAD OR WISHED YOU COULD GO TO SLEEP AND NOT WAKE UP?: NO
6. HAVE YOU EVER DONE ANYTHING, STARTED TO DO ANYTHING, OR PREPARED TO DO ANYTHING TO END YOUR LIFE?: NO

## 2024-07-25 ASSESSMENT — PAIN - FUNCTIONAL ASSESSMENT
PAIN_FUNCTIONAL_ASSESSMENT: 0-10

## 2024-07-25 NOTE — ANESTHESIA PROCEDURE NOTES
Airway  Date/Time: 7/25/2024 7:34 AM  Urgency: elective    Airway not difficult    Staffing  Performed: SRNA   Authorized by: Giovanni Holliday MD    Performed by: KELLIE Munoz  Patient location during procedure: OR    Indications and Patient Condition  Indications for airway management: anesthesia and airway protection  Spontaneous Ventilation: absent  Sedation level: deep  Preoxygenated: yes  Patient position: sniffing  Mask difficulty assessment: 1 - vent by mask    Final Airway Details  Final airway type: endotracheal airway      Successful airway: ETT  Cuffed: yes   Successful intubation technique: direct laryngoscopy  Facilitating devices/methods: intubating stylet  Endotracheal tube insertion site: oral  Blade: Arcelia  Blade size: #3  ETT size (mm): 7.0  Cormack-Lehane Classification: grade I - full view of glottis  Placement verified by: capnometry   Cuff volume (mL): 5  Measured from: lips  ETT to lips (cm): 21  Number of attempts at approach: 1  Ventilation between attempts: BVM

## 2024-07-25 NOTE — PROGRESS NOTES
Physical Therapy                 Therapy Communication Note    Patient Name: Madyson Whiteside  MRN: [id number]  Today's Date: 7/25/2024     Discipline: Physical Therapy    Missed Visit Reason: Missed Visit Reason: Other (Comment) (Per communication with PACU nurse Nadine Moore, patient ambulating > 15' using without AD with good balance and safety awareness. Strength intact throughout all extremities. No skilled PT needs identified at this time. Will discharge PT order.)    Missed Time: PT screen/DC

## 2024-07-25 NOTE — ANESTHESIA POSTPROCEDURE EVALUATION
Patient: Madyson Whiteside    Procedure Summary       Date: 07/25/24 Room / Location: [facility] A OR 07 / [facility]    Anesthesia Start: 0728 Anesthesia Stop: 0842    Procedure: Left L5-S1 Lumbar Microdiscectomy (Left: Spine Lumbar) Diagnosis:       Lumbar radiculopathy      Displacement of lumbar intervertebral disc without myelopathy      (Lumbar radiculopathy [M54.16])      (Displacement of lumbar intervertebral disc without myelopathy [M51.26])    Surgeons: Bartolo Barajas MD Responsible Provider: Giovanni Holliday MD    Anesthesia Type: general ASA Status: 2            Anesthesia Type: general    Vitals Value Taken Time   /62 07/25/24 0931   Temp 36.9 °C (98.4 °F) 07/25/24 0915   Pulse 87 07/25/24 0943   Resp 14 07/25/24 0930   SpO2 97 % 07/25/24 0943       Anesthesia Post Evaluation    Patient location during evaluation: PACU  Patient participation: complete - patient participated  Level of consciousness: awake  Pain management: adequate  Airway patency: patent  Cardiovascular status: acceptable  Respiratory status: acceptable  Hydration status: acceptable  Postoperative Nausea and Vomiting: none        There were no known notable events for this encounter.

## 2024-07-25 NOTE — DISCHARGE INSTRUCTIONS
Lumbar Decompression/Microdiscectomy (Dr. Barajas)    REMEMBER:  ACTIVTY:  Move regularly. Avoid staying in any one position longer than 1-2 hours, ambulate/walk frequently while awake. This will help with stiffness.  May sleep however is most comfortable (in bed or recliner)    May add in over-the-counter NSAIDs (Advil, Aleve, Motrin, ibuprofen, naproxen, diclofenac, meloxicam, Celebrex, etc) to treat pain (after taking Ketorolac if prescribed)    RESTRICTIONS:  Do not drive for 24 hours after surgery or while taking narcotics/muscle relaxants   No pushing, pulling, or lifting of objects greater than 5-10 pounds for 3 weeks  A gallon of milk is 8 pounds for reference.   No extreme bending, twisting, or turning of low back  May move as needed for activities of daily living.     BLOOD THINNERS (anticoagulants, plavix, asprin, etc):  May restart 5 days after surgery or as directed by prescribing provider.     WOUND CARE:  Do not shower for 48 hours following surgery.   Keep surgical incision clean and dry until shower. Change with non-adherent dressing daily and as needed.  If no drainage, may cover or leave uncovered based on personal preference.   Do not remove Steri-Strips they will fall off on their own.   Any nylon sutures will be removed by provider at follow up visit.   Remove lidocaine patch after 12 hours.     DIET:  Continue on clear liquid diet until passing gas.  Then may resume regular diet.   Continue stool softeners until bowel movement.   If you do not have a bowel movement in 72 hours with addition ofmagnesium citrate, go to the Emergency Department.     REMEMBER:   It is normal to have post-surgical back pain/spasms, even with small incisions.   Ice and/or heat may be helpful.    It is normal to have coming and going nerve pain in the legs as the nerve heals.   Nerve pain may be replaced initially with numbness and tingling/ pins and needles.    Week to week, post-surgical pain should become less  often and less intense.   Continue medication as prescribed.      CALL PHYSICIAN:   Signs of infection at incision site:   progressive drainage or an unusual odor  increased redness and or swelling  increased pain and or tenderness    Excessive Bleeding:  Slow general oozing that completely soaks dressing  Fresh bright red bleeding or bleeding that will not stop.   If drainage continues beyond 5 days of surgery.    Inability to go to the bathroom    FOR PRESCRIPTION REFILLS GIVE 48 HOURS NOTICE. Please do not wait until Friday after 3 pm to call.    Follow-up 2-3 weeks from surgery for radiographs and suture removal.    Dr. Bartolo Barajas' Office  [facility] - : [phone]  [place] Voicemail ([place] [facility] [place]): [phone]  *Please leave Name, , & call back number

## 2024-07-25 NOTE — PERIOPERATIVE NURSING NOTE
0838 Patient arrived to [place] after procedure with Anesthesia and procedure RN, procedure discussed, plan reviewed, VSS    0842 pt  updated via secure text    0852 pt medicated per order for pain    0900 Patient tolerating PO fluids at this time    0910 pt medicated per order for pain    0915 Patient tolerating crackers at this time.     0920 pt medicated per order for pain    0945 phase 2    0950 pt spouse at bedside    1000 Discharge instructions discussed with patient and family at this time verbalized understanding     1010 pt dressed with assist of RN at bedside    1015 Patient ambulated greater than 15 feet with a steady gait. Assistive devices used: None. No additional safety concerns. Notified assigned PT/OT ( Mallory Napier)    1024 Patient Discharged in stable condition via wheelchair to [facility]

## 2024-07-25 NOTE — OP NOTE
Left L5-S1 Lumbar Microdiscectomy (L) Operative Note     Date: 2024  OR Location: [facility]    Name: Madyson Whiteside, : 1990, Age: 34 y.o., MRN: [id number], Sex: female    Diagnosis  Pre-op Diagnosis      * Lumbar radiculopathy [M54.16]     * Displacement of lumbar intervertebral disc without myelopathy [M51.26] Post-op Diagnosis     * Lumbar radiculopathy [M54.16]     * Displacement of lumbar intervertebral disc without myelopathy [M51.26]     Procedures  Left L5-S1 Lumbar Microdiscectomy  49081 - MO LAMNOTMY INCL W/DCMPRSN NRV ROOT 1 INTRSPC LUMBR      Surgeons      * Bartolo Barajas - Primary    Resident/Fellow/Other Assistant:  Surgeons and Role:     * Libia Becerril PA-C - Assisting    Procedure Summary  Anesthesia: Anesthesia type not filed in the log.  ASA: II  Anesthesia Staff: Anesthesiologist: Giovanni Holliday MD  CRNA: KELLIE Deshpande  SRNA: KELLIE Munoz  Estimated Blood Loss: 1mL  Intra-op Medications:   Administrations occurring from 0730 to 0900 on 24:   Medication Name Total Dose   thrombin (recombinant) (Recothrom) topical solution 10,000 Units   gelatin absorbable (Gelfoam) 100 sponge 1 each   sodium chloride 0.9 % irrigation solution 1,000 mL              Anesthesia Record               Intraprocedure I/O Totals          Output    Est. Blood Loss 5 mL    Total Output 5 mL          Specimen: No specimens collected     Staff:   Circulator: Rena Meza Person: Lovely         Drains and/or Catheters: * None in log *    Tourniquet Times:         Implants:         Indications: Madyson Whiteside is an 34 y.o. female who is having surgery for Lumbar radiculopathy [M54.16]  Displacement of lumbar intervertebral disc without myelopathy [M51.26].     The patient was seen in the preoperative area. The risks, benefits, complications, treatment options, non-operative alternatives, expected recovery and outcomes were discussed with the patient. The possibilities of reaction to  medication, pulmonary aspiration, injury to surrounding structures, bleeding, recurrent infection, the need for additional procedures, failure to diagnose a condition, and creating a complication requiring transfusion or operation were discussed with the patient. The patient concurred with the proposed plan, giving informed consent.  The site of surgery was properly noted/marked if necessary per policy. The patient has been actively warmed in preoperative area. Preoperative antibiotics have been ordered and given within 1 hours of incision. Venous thrombosis prophylaxis have been ordered including bilateral sequential compression devices    Procedure Details: Patient was taken to the operating room where a formal timeout was done according to hospital protocol.  Patient was intubated without difficulty.  Patient was given preoperative antibiotics.  Patient was positioned prone on the Duarte table lumbar spine is prepped and draped in usual fashion.  C-arm fluoroscopy was used to identify the left L5-S1 interlaminar space.  Small incision was made the appropriate tractor was placed.  We then did a left-sided hemilaminotomy.  The S1 nerve root was visualized.  Directly ventral to the nerve root there was a large extruded fragment of disc coming out from a pre-existing annular tear.  The extruded fragment was then extracted.  We then did a discectomy and flushed within the disc space until there are no further free fragments in the S1 nerve root lateralized round pedicle freely with no further ventral compression.  Floseal was placed over the dura there is good hemostasis the retractor was withdrawn and the incision was closed in usual fashion sterile dressing was applied.    I was present and scrubbed for the entire procedure.  The physician assistant was present, scrubbed and participated in all portions of the procedure, as no qualified surgeon physician and/or resident surgeon was available to assist in the  case.      Bartolo Barajas MD    Chief of Spine Surgery, [facility]  Director of Spine Service, [facility]  , Department of Orthopaedics  [facility] [employer] [facility]  [address] [place] [address]  P: [phone]    This note was dictated with voice recognition software.  It has not been proofread for grammatical errors, typographical mistakes or other semantic inconsistencies.    Complications:  None; patient tolerated the procedure well.    Disposition: PACU - hemodynamically stable.  Condition: stable             Attending Attestation:     Bartolo Barajas  Phone Number: [phone]

## 2024-07-26 ASSESSMENT — PAIN SCALES - GENERAL: PAINLEVEL_OUTOF10: 2

## 2024-08-14 ENCOUNTER — OFFICE VISIT (OUTPATIENT)
Dept: ORTHOPEDIC SURGERY | Facility: CLINIC | Age: 34
End: 2024-08-14
Payer: COMMERCIAL

## 2024-08-14 VITALS — WEIGHT: 141 LBS | BODY MASS INDEX: 23.49 KG/M2 | HEIGHT: 65 IN

## 2024-08-14 DIAGNOSIS — M54.16 LUMBAR RADICULOPATHY: ICD-10-CM

## 2024-08-14 PROCEDURE — 3008F BODY MASS INDEX DOCD: CPT | Performed by: PHYSICIAN ASSISTANT

## 2024-08-14 PROCEDURE — 1036F TOBACCO NON-USER: CPT | Performed by: PHYSICIAN ASSISTANT

## 2024-08-14 PROCEDURE — 99211 OFF/OP EST MAY X REQ PHY/QHP: CPT | Performed by: PHYSICIAN ASSISTANT

## 2024-08-14 ASSESSMENT — PAIN - FUNCTIONAL ASSESSMENT: PAIN_FUNCTIONAL_ASSESSMENT: 0-10

## 2024-08-14 ASSESSMENT — PAIN SCALES - GENERAL: PAINLEVEL_OUTOF10: 5 - MODERATE PAIN

## 2024-08-14 ASSESSMENT — PAIN DESCRIPTION - DESCRIPTORS: DESCRIPTORS: ACHING;SORE

## 2024-08-14 NOTE — PROGRESS NOTES
"HPI:  Madyson Whiteside is a 34 y.o. female who presents today for initial postop visit after undergoing left L5-S1 microdiscectomy on 07/25/24 with Dr. Barajas.    Overall, patient is doing very well. She reports improvement of her preop radicular symptoms. Her main concern at this point is left hamstring \"tightness\" with an associated dull/achy pain radiating down the posterior thigh. Reports new numbness/tingling in the right buttock which started approx 1 week postop.   No focal motor weakness. No bowel or bladder dysfunction.    She is no longer taking the pain medication. She is slowly increasing her activity. No issues with the wound.    ROS:  Reviewed on EMR and patient intake sheet.    PMH/SH:  Reviewed on EMR and patient intake sheet.    Exam:  Well appearing, NAD  Pleasant & cooperative  BMI 23.46  Decreased ROM lumbar spine with rotation, flexion/extension  + paraspinal muscle spasms  lower extremity sensation intact to light touch  lower extremity motor 5/5 throughout  normal gait & station    lumbar wound healing well without signs of infection. No redness, palpable fluid collection or drainage. Small wound dehiscence superior portion of wound.  Sutures removed without complication.    Radiology:     none    Assessment and Plan:  1. Lumbar radiculopathy  - Referral to Physical Therapy; Future    Both the patient and I are very pleased with her recovery so far. I reassured her the residual symptoms are likely from the nerve still healing and will continue to improve with time/therapy.   I suspect she had a small postop seroma causing the right buttock numbness which will resolve over the next few weeks.     We reviewed wound care in detail today. Okay to shower letting warm, soapy water run down the wound, do not scrub, pat dry. Okay to apply Bacitracin ointment into area of wound opening and keep covered for the next week. If any signs of infection instructed to contact office.  No tub baths, pools, hot " tubs, etc until completely closed and scabbing resolved.    Okay to lift up to 25 pounds until 6 weeks postop, then may increase lifting as tolerated. Continue to limit excessive bending/lifting/twisting at the waist, however okay to do gentle ROM exercises to help reduce stiffness & increase mobility. No strenuous activity such as running/jumping/heavy lifting/activities that strain low back until 6 weeks postop. Encourage ambulating as tolerated. If needed okay to start outpatient physical therapy at 6-8 weeks postop - referral provided today.    Recommend transitioning from opioids to plain tylenol or nsaids.    She can follow up on an as needed basis. All questions answered. Patient in agreement to plan of care. Of course Madyson Whiteside can call at anytime with questions or concerns.     Libia Becerril PA-C  Department of Orthopaedic Surgery    [address] [place] [address]    Voicemail: [phone]   Appts: [phone]  Fax: [phone]

## 2024-08-28 ENCOUNTER — APPOINTMENT (OUTPATIENT)
Dept: ORTHOPEDIC SURGERY | Facility: CLINIC | Age: 34
End: 2024-08-28
Payer: COMMERCIAL

## 2024-08-29 ENCOUNTER — OFFICE VISIT (OUTPATIENT)
Dept: ORTHOPEDIC SURGERY | Facility: CLINIC | Age: 34
End: 2024-08-29
Payer: COMMERCIAL

## 2024-08-29 DIAGNOSIS — Z48.89 ENCOUNTER FOR POSTOPERATIVE WOUND CHECK: Primary | ICD-10-CM

## 2024-08-29 PROCEDURE — 99211 OFF/OP EST MAY X REQ PHY/QHP: CPT

## 2024-09-03 NOTE — PROGRESS NOTES
HPI:  Madyson Whiteside presents today for a postoperative wound check.  She has a history of a left L5/S1 lumbar microdiscectomy with Dr. Barajas on 7/25/2024.  Previously, she saw my colleague Libia Becerril PA-C and was given wound care instructions covering the incision and using bacitracin ointment.  She has been following these instructions.  She denies any signs of infection, drainage of the incision.    Otherwise, she is doing well from a surgical standpoint.    ROS:  Reviewed on EMR and patient intake sheet.    PMH/SH:  Reviewed on EMR and patient intake sheet.    Exam:  Skin: Lumbar incision visualized and appears well-healing without signs of infection, erythema, drainage.  Granulation tissue forming at the superior aspect of the incision.  Incision cleansed in office today and recovered with a sterile dressing.  MSK: Full strength range of motion of lower extremities bilaterally.  General: No acute distress. Awake and conversant.  Eyes: Normal conjunctiva, anicteric. Round symmetric pupils.  ENT: Hearing grossly intact. No nasal discharge.  Neck: Neck is supple. No masses or thyromegaly.  Respiratory: Respirations are non-labored. No wheezing.  Psych: Alert and oriented. Cooperative, appropriate mood and affect, normal judgement.  CV: No lower extremity edema.  Neuro: Sensation and CN II-XII grossly normal.    Radiology:     No new images reviewed today    Diagnosis:    Postoperative wound check    Assessment and Plan:  Patient is seen today and evaluated for a postoperative wound check following a lumbar microdiscectomy on July 25, 2024.  At this time, I recommended continuing covering the incision, keeping it clean and dry, cleaning it once daily with alcohol or hydrogen peroxide, and using bacitracin ointment to keep the incision moist to facilitate skin edge healing.  She may follow-up on an as-needed basis.  Patient feels her questions were answered today.  Patient agrees to plan above.    Duarte Mays  LESTER  Department of Orthopaedic Surgery  8:47 AM  09/03/24    [address] [id number]    Voicemail: [phone]   Appts: [phone]  Fax: [phone]

## 2024-09-12 ENCOUNTER — EVALUATION (OUTPATIENT)
Dept: PHYSICAL THERAPY | Facility: CLINIC | Age: 34
End: 2024-09-12
Payer: COMMERCIAL

## 2024-09-12 DIAGNOSIS — M51.26 DISPLACEMENT OF LUMBAR INTERVERTEBRAL DISC WITHOUT MYELOPATHY: ICD-10-CM

## 2024-09-12 DIAGNOSIS — M54.16 LUMBAR RADICULOPATHY: Primary | ICD-10-CM

## 2024-09-12 PROCEDURE — 97161 PT EVAL LOW COMPLEX 20 MIN: CPT | Mod: GP

## 2024-09-12 PROCEDURE — 97110 THERAPEUTIC EXERCISES: CPT | Mod: GP

## 2024-09-12 ASSESSMENT — ENCOUNTER SYMPTOMS
DEPRESSION: 0
OCCASIONAL FEELINGS OF UNSTEADINESS: 0
LOSS OF SENSATION IN FEET: 0

## 2024-09-12 NOTE — PROGRESS NOTES
Physical Therapy Evaluation    Subjective:  Patient Name: Madyson Whiteside  MRN: [id number]  Today's Date: 9/12/2024  Visit: 1  Referred by: Libia Becerril/Karl  Time Calculation  Start Time: 1415  Stop Time: 1500  Time Calculation (min): 45 min  Diagnosis: lumbar radiculopathy  Mechanism of Injury:  Patient is s/p L L5-S1 lumbar microdiscectomy 7/25/24.  Has had some difficulty with wound healing at proximal incision but now improving.  Pain originally began when pregnant in 2023 and had significant loss of strength in S1 distribution and difficulty sitting.  Sx improved but returned in early summer 2024, requiring surgery  Location: L glute, posterior thigh, posterior knee  Pain Rating:  Denies pain, only intermittent paresthesia in leg.  Constant numbness L plantar foot  Increased pain:  sitting on floor with legs crossed, lifting  Decreased pain: takes anti-inflammatory about once daily    Current Medical Management:  -Has done dry needling, cupping, chiropractic prior to PT  Precautions: microdiscectomy- see protocol  Functional Limitations: limits lifting (dtr in 23#), cautious with bending, does not exercise  Prior Level of Function: previously did crossfit, walks for exercise  Work Status: sales  Living Environment: no issues  Social Support: , 1 yr old, 3 yr old  Personal Factors that may impact care: none    Objective:    Gait: unremarkable  Palpation: mild TTP near L5    Lumbar ROM:  Flexion= 75%  Extension= 75%  Sidebend= WNL  Rotation= WNL    LE Strength (R/L):  Hip flexion= 5/4+  Hip abduction= L= 5  Hip extension= 5/5  Knee flexion= 5/4+  Knee extension= 5/5  Ankle DF= 5/5  Ankle PF= 5/5    LE ROM/Flexibility (R/L):  Knee flexion= WNL  Knee extension= WNL  Hip flexion= WNL  Hip extension= WNL  Hip abduction= WNL  Hamstrings= 87/78  Quads= negative tightness  HF= negative tightness    Special Tests:  Negative ANNE/FADIR L  + neural tension L    Oswestry= 0%    Treatment Performed Today:  Instructed  in and issued for HEP:  Bent knee fallouts, pelvic tilts, wall squats, clam shells, prone hip extension    Assessment:  33 yo F s/p L5-S1 microdiscectomy 7/25/24.  Presents with mild lumbar ROM deficits, decreased core strength and functional limitations.  Would benefit from PT to address deficits and aid in return to previous leisure and functional activities.   . Low complexity due to patient's clinical presentation being stable and uncomplicated by any significant comorbidities that may affect rehab tolerance and progression.    Clinical presentation:  Stable and/or uncomplicated characteristics,  Problem List:  Limited lumbar ROM, decreased core strength, decreased leisure/functional activity  Level of Complexity:  low  Plan:  -Goals:  Active       PT Problem       Able to return to lifting (young children) with proper body mechanics and without pain       Start:  09/12/24    Expected End:  12/11/24            Increase L hamstring length to that of R       Start:  09/12/24    Expected End:  12/11/24            Able to tolerate moderate level DLS exercise.       Start:  09/12/24    Expected End:  12/11/24            Able to return to crossfit       Start:  09/12/24    Expected End:  12/11/24              -Frequency & Duration:   1x/week x 6 weeks  -Rehab Potential:   excellent  Plan of care was developed with input and agreement of the patient.    Billing:  PT Evaluation Time Entry  PT Evaluation (Low) Time Entry: 30  PT Therapeutic Procedures Time Entry  Therapeutic Exercise Time Entry: 15

## 2024-09-17 ENCOUNTER — APPOINTMENT (OUTPATIENT)
Dept: PHYSICAL THERAPY | Facility: CLINIC | Age: 34
End: 2024-09-17
Payer: COMMERCIAL

## 2024-09-19 ENCOUNTER — TREATMENT (OUTPATIENT)
Dept: PHYSICAL THERAPY | Facility: CLINIC | Age: 34
End: 2024-09-19
Payer: COMMERCIAL

## 2024-09-19 DIAGNOSIS — M54.16 LUMBAR RADICULOPATHY: Primary | ICD-10-CM

## 2024-09-19 DIAGNOSIS — M51.26 DISPLACEMENT OF LUMBAR INTERVERTEBRAL DISC WITHOUT MYELOPATHY: ICD-10-CM

## 2024-09-19 PROCEDURE — 97110 THERAPEUTIC EXERCISES: CPT | Mod: GP

## 2024-09-19 NOTE — PROGRESS NOTES
Physical Therapy Treatment    Patient Name: Madyson Whiteside  MRN: [id number]  Today's Date: 9/19/2024   Visit 2/30  Time Calculation  Start Time: 1200  Stop Time: 1245  Time Calculation (min): 45 min  Dx: lumbar radiculopathy    PRECAUTIONS:      SUBJECTIVE:  Patient reports no pain but does feel light sensation in L posterior thigh most of the time.  Compliant with HEP? yes    OBJECTIVE:      TREATMENT:  - Therex:  Elliptical x 5 min  Standing hamstring stretch x 1 min bilat  BTB mid and low rows with abdominal bracing, 2x15 reps  Cable Column:  -7.5# resisted side stepping L and R x 5 reps with abdominal bracing  Multihip x 12 reps bilat  -cues for abdominal bracing  -plate 2 hip abduction, flexion, and extension  Mat Ex:  -prone planks 2x30 sec  -side planks 2x30 sec each    Current HEP:  Bent knee fallouts, pelvic tilts, wall squats, clam shells, prone hip extension    ASSESSMENT:   Patient progressing well with gradual progression of core strengthening.  Good posture/body mechanics with exercises  EDUCATION:  Discussed appropriate exercises patient can return to at home/gym including planks, 3 way hip with band, standing rows with abdominal bracing, pull ups, shallow squats.  Educated on importance of body mechanics and avoidance of lumbar flexion as well as using sx as guide  PLAN:    Continue to progress core strengthening.  Patient to bring in list of previous exercises to review    Billing:     PT Therapeutic Procedures Time Entry  Therapeutic Exercise Time Entry: 45

## 2024-09-24 ENCOUNTER — APPOINTMENT (OUTPATIENT)
Dept: PHYSICAL THERAPY | Facility: CLINIC | Age: 34
End: 2024-09-24
Payer: COMMERCIAL

## 2024-09-26 ENCOUNTER — TREATMENT (OUTPATIENT)
Dept: PHYSICAL THERAPY | Facility: CLINIC | Age: 34
End: 2024-09-26
Payer: COMMERCIAL

## 2024-09-26 DIAGNOSIS — M54.16 LUMBAR RADICULOPATHY: ICD-10-CM

## 2024-09-26 DIAGNOSIS — M51.26 DISPLACEMENT OF LUMBAR INTERVERTEBRAL DISC WITHOUT MYELOPATHY: ICD-10-CM

## 2024-09-26 PROCEDURE — 97110 THERAPEUTIC EXERCISES: CPT | Mod: GP

## 2024-09-26 NOTE — PROGRESS NOTES
Physical Therapy Treatment    Patient Name: Madyson Whiteside  MRN: [id number]  Today's Date: 9/26/2024   Visit 3/30  Time Calculation  Start Time: 1115  Stop Time: 1200  Time Calculation (min): 45 min  Dx: lumbar radiculopathy    PRECAUTIONS:  S/p microdiscectomy    SUBJECTIVE:  Hasn't done gym exercises yet due to being out of town.  Patient reports L posterior thigh sensation is improving in frequency lately.    Has a friend who is a crossfit PT who will be sending her a 4 week work out program for her to follow.  Compliant with HEP? yes    OBJECTIVE:  Good maintenance of posture throughout exercises.    TREATMENT:  - Therex:  Elliptical x 5 min  Standing hamstring stretch x 1 min bilat  BTB mid and low rows with abdominal bracing, 2x15 reps  Cable Column:  -7.5# resisted side stepping L and R x 5 reps with abdominal bracing  Multihip x 12 reps bilat  -cues for abdominal bracing  -plate 3 hip abduction, flexion, and extension  Quadruped Les x 10 reps  Bird dog x 10 reps  Mat Ex:  -prone planks 1x30 sec  -side planks 1x30 sec each    Current HEP:  Bent knee fallouts, pelvic tilts, wall squats, clam shells, prone hip extension    ASSESSMENT:   Patient progressing well with gradual progression of core strengthening.  Good posture/body mechanics with exercises.      EDUCATION:  Reached out to PA regarding incision that is not yet healed.  PA responded back during visit and recommended keeping covered with antibacterial ointment, clean with alcohol.  Communicated this to patient.  PLAN:    Continue to progress core strengthening.    Billing:     PT Therapeutic Procedures Time Entry  Therapeutic Exercise Time Entry: 45

## 2024-10-01 ENCOUNTER — APPOINTMENT (OUTPATIENT)
Dept: PHYSICAL THERAPY | Facility: CLINIC | Age: 34
End: 2024-10-01
Payer: COMMERCIAL

## 2024-10-08 ENCOUNTER — TREATMENT (OUTPATIENT)
Dept: PHYSICAL THERAPY | Facility: CLINIC | Age: 34
End: 2024-10-08
Payer: COMMERCIAL

## 2024-10-08 DIAGNOSIS — M51.26 DISPLACEMENT OF LUMBAR INTERVERTEBRAL DISC WITHOUT MYELOPATHY: ICD-10-CM

## 2024-10-08 DIAGNOSIS — M54.16 LUMBAR RADICULOPATHY: Primary | ICD-10-CM

## 2024-10-08 PROCEDURE — 97110 THERAPEUTIC EXERCISES: CPT | Mod: GP

## 2024-10-08 NOTE — PROGRESS NOTES
Physical Therapy Treatment    Patient Name: Madyson Whiteside  MRN: [id number]  Today's Date: 10/8/2024   Visit 4/30  Time Calculation  Start Time: 1115  Stop Time: 1200  Time Calculation (min): 45 min  Dx: lumbar radiculopathy    PRECAUTIONS:  S/p microdiscectomy    SUBJECTIVE:  Patient has been to gym 5 times.  Hasn't had any radicular pain lately.  Notices only some pressure in LS area when tension placed on nerve (cervical flexion)  Compliant with HEP? yes    OBJECTIVE:  Good maintenance of posture throughout exercises.    TREATMENT:  - Therex:  Elliptical x 5 min  Standing hamstring stretch x 1 min bilat  Mat Ex:  -passive HF stretching over EOB 2x1 min bilat  -passive prone quad stretch 2x1 min bilat  BTB mid and low rows with abdominal bracing, 2x15 reps  Multihip x 12 reps bilat  -cues for abdominal bracing  -plate 3 hip abduction, flexion, and extension  Quadruped Les x 15 reps  Bird dog x 15 reps    Current HEP:  Bent knee fallouts, pelvic tilts, wall squats, clam shells, prone hip extension    ASSESSMENT:   Very good understanding of precautions and good compliance with exercise/activity.  Appears to be tolerating progression of strengthening well, now without radicular sx.      EDUCATION:  Continued HEP/gym routine with modifications as needed.  PLAN:    Continue to progress core strengthening.    Billing:     PT Therapeutic Procedures Time Entry  Therapeutic Exercise Time Entry: 45

## 2024-10-11 ENCOUNTER — OFFICE VISIT (OUTPATIENT)
Dept: ORTHOPEDIC SURGERY | Facility: CLINIC | Age: 34
End: 2024-10-11
Payer: COMMERCIAL

## 2024-10-11 DIAGNOSIS — Z48.89 ENCOUNTER FOR POSTOPERATIVE WOUND CHECK: Primary | ICD-10-CM

## 2024-10-11 PROCEDURE — 99024 POSTOP FOLLOW-UP VISIT: CPT | Performed by: ORTHOPAEDIC SURGERY

## 2024-10-15 ENCOUNTER — APPOINTMENT (OUTPATIENT)
Dept: PHYSICAL THERAPY | Facility: CLINIC | Age: 34
End: 2024-10-15
Payer: COMMERCIAL

## 2024-10-15 NOTE — PROGRESS NOTES
HPI:Madyson Whiteside is a 34-year-old woman who is status post microdiscectomy approximate 2 months ago.  She had a suture reaction and suture was removed at a prior visit.  She comes in today for a wound check.  No fevers chills.  No purulence.  She is doing activities as tolerated and has returned to the gym.      ROS:  Reviewed on EMR and patient intake sheet.    PMH/SH:  Reviewed on EMR and patient intake sheet.    Exam:  Physical Exam    On exam, there is no surrounding erythema.  Small pinhole filled with granulating tissue is noted.  This hole is much smaller than it was on prior pictures from about 4 weeks ago.  No evidence of infection.    Radiology:     No imaging done today    Diagnosis:    Suture reaction    Assessment and Plan:   34-year-old woman with a suture reaction which is healing uneventfully.  She will continue local wound care and follow-up as necessary.  Otherwise, she is doing very well from a spine standpoint.    The patient was in agreement with the plan. At the end of the visit today, the patient felt that all questions had been answered satisfactorily.  The patient was pleased with the visit and very appreciative for the care rendered.     Thank you very much for the kind referral.  It is a privilege, and a pleasure, to partner with you in the care of your patients.  I would be delighted to assist you with any further consultations as needed.          Bartolo Barajas MD    Chief of Spine Surgery, [facility]  Director of Spine Service, [facility]  , Department of Orthopaedics  [facility]  [address] [place] [address]  P: [phone]  [facility].[facility]    This note was dictated with voice recognition software.  It has not been proofread for grammatical errors, typographical mistakes or other semantic inconsistencies.

## 2024-11-12 ENCOUNTER — APPOINTMENT (OUTPATIENT)
Dept: PHYSICAL THERAPY | Facility: CLINIC | Age: 34
End: 2024-11-12
Payer: COMMERCIAL

## 2025-02-21 ENCOUNTER — DOCUMENTATION (OUTPATIENT)
Dept: PHYSICAL THERAPY | Facility: CLINIC | Age: 35
End: 2025-02-21
Payer: COMMERCIAL

## (undated) DEVICE — TIP,  ELECTRODE COATED INSULATED, EXTENDED, LF

## (undated) DEVICE — Device

## (undated) DEVICE — COVER, C-ARM W/CLIPS, OEC GE

## (undated) DEVICE — DIFFUSER, MAESTRO, CORE

## (undated) DEVICE — GLOVE, SURGICAL, PROTEXIS PI MICRO, 7.5, PF, LF

## (undated) DEVICE — BUR, 3MM PRECISION MATCH HEAD, 16CM

## (undated) DEVICE — SUTURE, ETHILON, 2-0, FSLX 30, BLACK

## (undated) DEVICE — DRESSING, ADHESIVE, ISLAND, TELFA, 2 X 3.75 IN, LF

## (undated) DEVICE — STRIP, SKIN CLOSURE, STERI STRIP, REINFORCED, 0.5 X 4 IN

## (undated) DEVICE — GLOVE, SURGICAL, PROTEXIS PI ORTHO, 8.0, PF, LF

## (undated) DEVICE — SOLUTION, IRRIGATION, SODIUM CHLORIDE 0.9%, 1000 ML, POUR BOTTLE

## (undated) DEVICE — SUTURE, PDS II, 0, 27 IN, CT1, VIOLET

## (undated) DEVICE — TOWEL, SURGICAL, NEURO, O/R, 16 X 26, BLUE, STERILE

## (undated) DEVICE — ADHESIVE, SKIN, LIQUIBAND EXCEED

## (undated) DEVICE — DRAPE, C-ARM, W/12 IN COVER, LI XTRAY TUBE

## (undated) DEVICE — DRAPE, INCISE, ANTIMICROBIAL, IOBAN 2, LARGE, 17 X 23 IN, DISPOSABLE, STERILE

## (undated) DEVICE — DRAPE, MICROSCOPE, FOR ZEISS 65MM, VARI-LENS II, 52 X 150

## (undated) DEVICE — SUTURE, PDS II, 2-0, 27 IN, SH, VIOLET

## (undated) DEVICE — ADHESIVE, SKIN, MASTISOL, 2/3 CC VIAL

## (undated) DEVICE — PREP TRAY, VAGINAL